# Patient Record
Sex: FEMALE | Race: WHITE | NOT HISPANIC OR LATINO | Employment: FULL TIME | URBAN - METROPOLITAN AREA
[De-identification: names, ages, dates, MRNs, and addresses within clinical notes are randomized per-mention and may not be internally consistent; named-entity substitution may affect disease eponyms.]

---

## 2019-02-15 ENCOUNTER — OFFICE VISIT (OUTPATIENT)
Dept: FAMILY MEDICINE CLINIC | Facility: CLINIC | Age: 49
End: 2019-02-15

## 2019-02-15 VITALS
OXYGEN SATURATION: 98 % | SYSTOLIC BLOOD PRESSURE: 142 MMHG | HEART RATE: 75 BPM | WEIGHT: 161 LBS | BODY MASS INDEX: 27.49 KG/M2 | RESPIRATION RATE: 18 BRPM | TEMPERATURE: 97.7 F | HEIGHT: 64 IN | DIASTOLIC BLOOD PRESSURE: 80 MMHG

## 2019-02-15 DIAGNOSIS — H65.02 ACUTE SEROUS OTITIS MEDIA OF LEFT EAR, RECURRENCE NOT SPECIFIED: ICD-10-CM

## 2019-02-15 DIAGNOSIS — J45.20 MILD INTERMITTENT ASTHMA WITHOUT COMPLICATION: Primary | ICD-10-CM

## 2019-02-15 PROCEDURE — 99213 OFFICE O/P EST LOW 20 MIN: CPT | Performed by: NURSE PRACTITIONER

## 2019-02-15 RX ORDER — ALBUTEROL SULFATE 90 UG/1
2 AEROSOL, METERED RESPIRATORY (INHALATION) EVERY 4 HOURS PRN
COMMUNITY
End: 2019-02-15 | Stop reason: SDUPTHER

## 2019-02-15 RX ORDER — AMOXICILLIN 875 MG/1
875 TABLET, COATED ORAL 2 TIMES DAILY
Qty: 20 TABLET | Refills: 0 | Status: SHIPPED | OUTPATIENT
Start: 2019-02-15 | End: 2021-04-26

## 2019-02-15 RX ORDER — ALBUTEROL SULFATE 90 UG/1
2 AEROSOL, METERED RESPIRATORY (INHALATION) EVERY 4 HOURS PRN
Qty: 1 INHALER | Refills: 11 | Status: SHIPPED | OUTPATIENT
Start: 2019-02-15 | End: 2023-02-16

## 2019-02-15 NOTE — PROGRESS NOTES
Subjective   Catarina Pack is a 48 y.o. female.   Chief Complaint   Patient presents with   • Earache     patient c/o left ear pain/fullness     Vitals:    02/15/19 1306   BP: 142/80   Pulse: 75   Resp: 18   Temp: 97.7 °F (36.5 °C)   SpO2: 98%     No LMP recorded.    Catarina is here for an acute visit.       Earache    There is pain in the left ear. This is a new problem. The current episode started in the past 7 days. The problem occurs constantly. The problem has been unchanged. There has been no fever. The pain is mild. Pertinent negatives include no coughing, ear discharge or rhinorrhea. She has tried nothing for the symptoms. There is no history of a chronic ear infection, hearing loss or a tympanostomy tube.    She has a history of asthma and would like a refill on albuterol     The following portions of the patient's history were reviewed and updated as appropriate: allergies, current medications, past family history, past medical history, past social history, past surgical history and problem list.    Review of Systems   Constitutional: Negative for chills and fatigue.   HENT: Positive for congestion and ear pain. Negative for ear discharge and rhinorrhea.    Respiratory: Negative for cough, shortness of breath and wheezing.    Cardiovascular: Negative.        Objective   Physical Exam   Constitutional: Vital signs are normal. She appears well-developed and well-nourished. She does not appear ill. No distress.   HENT:   Head: Normocephalic.   Left Ear: Tympanic membrane is erythematous. A middle ear effusion is present.   Nose: Nose normal.   Mouth/Throat: Uvula is midline, oropharynx is clear and moist and mucous membranes are normal.   Right TM obscured by cerumen    Cardiovascular: Normal rate, regular rhythm and normal heart sounds.   Pulmonary/Chest: Effort normal and breath sounds normal.   Neurological: She is alert.       Assessment/Plan   Catarina was seen today for  earache.    Diagnoses and all orders for this visit:    Mild intermittent asthma without complication    Acute serous otitis media of left ear, recurrence not specified    Other orders  -     albuterol sulfate  (90 Base) MCG/ACT inhaler; Inhale 2 puffs Every 4 (Four) Hours As Needed for Wheezing.  -     amoxicillin (AMOXIL) 875 MG tablet; Take 1 tablet by mouth 2 (Two) Times a Day.      Suggest Flonase or Nasacort over-the-counter  Follow-up in 2 weeks if your symptoms are persistent or sooner if your symptoms worsen or new symptoms develop

## 2021-04-22 ENCOUNTER — TELEPHONE (OUTPATIENT)
Dept: INTERNAL MEDICINE | Facility: CLINIC | Age: 51
End: 2021-04-22

## 2021-04-22 NOTE — TELEPHONE ENCOUNTER
Caller: Catarina Carlson    Relationship: Self    Best call back number:     What is the best time to reach you: ANYTIME    Who are you requesting to speak with (clinical staff, provider,  specific staff member):       Do you know the name of the person who called:     What was the call regarding: PATIENT IS CALLING IN STATING THAT SHE IS HAVING A REALLY HARD TIME WITH HEMORRHOIDS AND SHE WANTS TO KNOW IF THERE IS SOMETHING SO CAN DO TO HAVE THEM REMOVED.  SHE WANTS TO KNOW IF SHE NEEDS TO SEE DR KENY WISE OF IF SHE NEEDS TO BE REFERRED TO SOMEONE.     Do you require a callback: YES

## 2021-04-23 NOTE — TELEPHONE ENCOUNTER
I would like to see her since I haven't seen her since 2019. If she cannot come in. Please give her colorectal surgery # and she can call and schedule an appt . Also please make sure she knows that I changed locations.   Thank you!

## 2021-04-26 ENCOUNTER — OFFICE VISIT (OUTPATIENT)
Dept: INTERNAL MEDICINE | Facility: CLINIC | Age: 51
End: 2021-04-26

## 2021-04-26 VITALS
OXYGEN SATURATION: 98 % | HEART RATE: 70 BPM | TEMPERATURE: 97.7 F | HEIGHT: 64 IN | WEIGHT: 156 LBS | SYSTOLIC BLOOD PRESSURE: 122 MMHG | BODY MASS INDEX: 26.63 KG/M2 | RESPIRATION RATE: 16 BRPM | DIASTOLIC BLOOD PRESSURE: 60 MMHG

## 2021-04-26 DIAGNOSIS — K64.9 HEMORRHOIDS, UNSPECIFIED HEMORRHOID TYPE: Primary | ICD-10-CM

## 2021-04-26 DIAGNOSIS — Z12.11 ENCOUNTER FOR SCREENING COLONOSCOPY: ICD-10-CM

## 2021-04-26 DIAGNOSIS — D22.9 NEVUS: ICD-10-CM

## 2021-04-26 PROCEDURE — 99213 OFFICE O/P EST LOW 20 MIN: CPT | Performed by: NURSE PRACTITIONER

## 2021-04-26 RX ORDER — PRAMOXINE HYDROCHLORIDE HYDROCORTISONE ACETATE 100; 100 MG/10G; MG/10G
1 AEROSOL, FOAM TOPICAL 2 TIMES DAILY
Qty: 10 G | Refills: 3 | Status: SHIPPED | OUTPATIENT
Start: 2021-04-26 | End: 2021-09-22

## 2021-04-26 NOTE — PROGRESS NOTES
Subjective   Catarina Pack is a 50 y.o. female.   Chief Complaint   Patient presents with   • Hemorrhoids     flare up   • Suspicious Skin Lesion     Vitals:    04/26/21 1400   BP: 122/60   Pulse: 70   Resp: 16   Temp: 97.7 °F (36.5 °C)   SpO2: 98%     Patient's last menstrual period was 04/23/2021.    History of Present Illness  Catarina is a 50 year old female patient who is here for an acute visit. She c/o hemorrhoids for the last 3 weeks. She has done otc medication and sitz baths with some relief. She wants to discuss possible surgery or removal. She has been more active with exercise . She has a high fiber diet and takes metamucil daily  She has some moles on her arm and leg she would like me to look at .     The following portions of the patient's history were reviewed and updated as appropriate: allergies, current medications, past family history, past medical history, past social history, past surgical history and problem list.    Review of Systems   Constitutional: Negative.    Respiratory: Negative.    Cardiovascular: Negative.    Gastrointestinal: Negative for anal bleeding, blood in stool and constipation.        Hemorrhoids        Objective   Physical Exam  Vitals and nursing note reviewed.   Constitutional:       General: She is not in acute distress.  HENT:      Head: Normocephalic.   Cardiovascular:      Rate and Rhythm: Normal rate.   Pulmonary:      Effort: Pulmonary effort is normal.   Genitourinary:     Rectum: External hemorrhoid (2 small  noted, one is erythematous ) present.   Skin:     General: Skin is warm and dry.      Comments: Raised nevus on left upper arm  Raised nevus on left lower leg    Neurological:      Mental Status: She is alert and oriented to person, place, and time.         Assessment/Plan   Diagnoses and all orders for this visit:    1. Hemorrhoids, unspecified hemorrhoid type (Primary)  -     Ambulatory Referral to Colorectal Surgery    2. Encounter for  screening colonoscopy  -     Ambulatory Referral to Colorectal Surgery    3. Nevus    Other orders  -     Hydrocort-Pramoxine, Perianal, (Proctofoam HC) 1-1 % rectal foam; Insert 1 applicator into the rectum 2 (Two) Times a Day. As need for hemorrhoids  Dispense: 10 g; Refill: 3      Will refer to colorectal surgery for hemorrhoids, she is also due for screening colonoscopy  Continue sitz baths as needed  Avoid straining   Continue high fiber diet  Witch hazel pads as needed  Recommend scheduling an appt with dermatology for evaluation of nevi  Schedule CPE with fasting labs  Follow up sooner if symptoms persist, worsen or if new symptoms develop

## 2021-07-26 ENCOUNTER — OFFICE VISIT (OUTPATIENT)
Dept: SURGERY | Facility: CLINIC | Age: 51
End: 2021-07-26

## 2021-07-26 VITALS
HEIGHT: 64 IN | BODY MASS INDEX: 27.06 KG/M2 | HEART RATE: 61 BPM | DIASTOLIC BLOOD PRESSURE: 84 MMHG | OXYGEN SATURATION: 98 % | SYSTOLIC BLOOD PRESSURE: 140 MMHG | WEIGHT: 158.5 LBS | TEMPERATURE: 97.6 F

## 2021-07-26 DIAGNOSIS — Z12.11 SCREENING FOR MALIGNANT NEOPLASM OF COLON: Primary | ICD-10-CM

## 2021-07-26 DIAGNOSIS — K64.4 ANAL SKIN TAG: ICD-10-CM

## 2021-07-26 PROCEDURE — 99243 OFF/OP CNSLTJ NEW/EST LOW 30: CPT | Performed by: COLON & RECTAL SURGERY

## 2021-07-26 RX ORDER — SODIUM CHLORIDE, SODIUM LACTATE, POTASSIUM CHLORIDE, CALCIUM CHLORIDE 600; 310; 30; 20 MG/100ML; MG/100ML; MG/100ML; MG/100ML
30 INJECTION, SOLUTION INTRAVENOUS CONTINUOUS
Status: CANCELLED | OUTPATIENT
Start: 2021-09-23

## 2021-07-26 NOTE — PROGRESS NOTES
Catarina Pack is a 51 y.o. female who is seen as a consult at the request of SAJI Verdin for Consult.  Anal swelling    HPI:  Pt c/o hemorrhoids that become enlarged and bothersome approximately every 4 months and lasts for 4-6 weeks.   Area can become painful when they are enlarged.   Concerned that some of her symptoms may be secondary to strength training   Uses OTC creams and sitz baths with some improvement in symptoms.   No RB  Also c/o bloating    Denies any previous colonoscopy     Unknown FHx of colon polyps or colon cancer    Past Medical History:   Diagnosis Date   • Allergic rhinitis    • Asthma     MILD,INTERMITTENT   • Breast asymmetry    • Fibrocystic mastopathy, left 2015   • H/O threatened  2016   • Hemorrhoids    • Influenza A 2018   • SAB (spontaneous )      A1       Past Surgical History:   Procedure Laterality Date   • COLONOSCOPY N/A 2012   • VAGINAL DELIVERY N/A 10/04/2013    DR. CHERRY PEREZ AT [facility]       Social History:   reports that she has never smoked. She has never used smokeless tobacco. She reports current alcohol use. She reports that she does not use drugs.      Marriage status:     Family History   Adopted: Yes   Problem Relation Age of Onset   • Diabetes Son          Current Outpatient Medications:   •  albuterol sulfate  (90 Base) MCG/ACT inhaler, Inhale 2 puffs Every 4 (Four) Hours As Needed for Wheezing., Disp: 1 inhaler, Rfl: 11  •  Hydrocort-Pramoxine, Perianal, (Proctofoam HC) 1-1 % rectal foam, Insert 1 applicator into the rectum 2 (Two) Times a Day. As need for hemorrhoids, Disp: 10 g, Rfl: 3  •  hydrocortisone 2.5 % cream, Insert  into the rectum., Disp: , Rfl:     Allergy  Patient has no known allergies.    Review of Systems   Constitutional: Negative for decreased appetite and weight gain.   HENT: Negative for congestion, hearing loss and hoarse voice.    Eyes: Negative for blurred vision,  discharge and visual disturbance.   Cardiovascular: Negative for chest pain, cyanosis and leg swelling.   Respiratory: Negative for cough, shortness of breath, sleep disturbances due to breathing and snoring.    Endocrine: Negative for cold intolerance and heat intolerance.   Hematologic/Lymphatic: Does not bruise/bleed easily.   Skin: Negative for itching, poor wound healing and skin cancer.   Musculoskeletal: Negative for arthritis, back pain, joint pain and joint swelling.   Gastrointestinal: Negative for abdominal pain, change in bowel habit, bowel incontinence and constipation.   Genitourinary: Negative for bladder incontinence, dysuria and hematuria.   Neurological: Negative for brief paralysis, excessive daytime sleepiness, dizziness, focal weakness, headaches, light-headedness and weakness.   Psychiatric/Behavioral: Negative for altered mental status and hallucinations. The patient does not have insomnia.    Allergic/Immunologic: Negative for HIV exposure and persistent infections.   All other systems reviewed and are negative.      Vitals:    07/26/21 1317   BP: 140/84   Pulse: 61   Temp: 97.6 °F (36.4 °C)   SpO2: 98%     Body mass index is 27.21 kg/m².    Physical Exam  Exam conducted with a chaperone present.   Constitutional:       General: She is not in acute distress.     Appearance: She is well-developed.   HENT:      Head: Normocephalic and atraumatic.      Nose: Nose normal.   Eyes:      Conjunctiva/sclera: Conjunctivae normal.      Pupils: Pupils are equal, round, and reactive to light.   Neck:      Trachea: No tracheal deviation.   Pulmonary:      Effort: Pulmonary effort is normal. No respiratory distress.      Breath sounds: Normal breath sounds.   Abdominal:      General: Bowel sounds are normal. There is no distension.      Palpations: Abdomen is soft.   Genitourinary:     Comments: Perianal exam: external anal tag posteriorly   JORGE- good tone, no masses      Musculoskeletal:         General:  No deformity. Normal range of motion.      Cervical back: Normal range of motion.   Skin:     General: Skin is warm and dry.   Neurological:      Mental Status: She is alert and oriented to person, place, and time.      Cranial Nerves: No cranial nerve deficit.      Coordination: Coordination normal.      Gait: Gait normal.   Psychiatric:         Behavior: Behavior normal.         Judgment: Judgment normal.         Assessment:  1. Screening for malignant neoplasm of colon    2. Anal skin tag      Plan:  - Will schedule colonoscopy with an anal skin tag removal  - Recommended probiotics and Fiber      Scribed for Jose Ortiz MD by Radha Armstrong PA-C 7/27/2021  20:39 EDT   This patient was evaluated by me, recommendations made, documentation reviewed, edited, and revised by me, Jose Ortiz MD

## 2021-09-23 ENCOUNTER — ANESTHESIA (OUTPATIENT)
Dept: GASTROENTEROLOGY | Facility: HOSPITAL | Age: 51
End: 2021-09-23

## 2021-09-23 ENCOUNTER — HOSPITAL ENCOUNTER (OUTPATIENT)
Facility: HOSPITAL | Age: 51
Setting detail: HOSPITAL OUTPATIENT SURGERY
Discharge: HOME | End: 2021-09-23
Attending: COLON & RECTAL SURGERY | Admitting: COLON & RECTAL SURGERY

## 2021-09-23 ENCOUNTER — ANESTHESIA EVENT (OUTPATIENT)
Dept: GASTROENTEROLOGY | Facility: HOSPITAL | Age: 51
End: 2021-09-23

## 2021-09-23 VITALS
HEART RATE: 63 BPM | DIASTOLIC BLOOD PRESSURE: 74 MMHG | WEIGHT: 152.8 LBS | OXYGEN SATURATION: 99 % | BODY MASS INDEX: 26.09 KG/M2 | HEIGHT: 64 IN | SYSTOLIC BLOOD PRESSURE: 117 MMHG | RESPIRATION RATE: 14 BRPM

## 2021-09-23 DIAGNOSIS — Z12.11 SCREENING FOR MALIGNANT NEOPLASM OF COLON: ICD-10-CM

## 2021-09-23 LAB
B-HCG UR QL: NEGATIVE
INTERNAL NEGATIVE CONTROL: NEGATIVE
INTERNAL POSITIVE CONTROL: POSITIVE
Lab: NORMAL

## 2021-09-23 PROCEDURE — 88304 TISSUE EXAM BY PATHOLOGIST: CPT | Performed by: COLON & RECTAL SURGERY

## 2021-09-23 PROCEDURE — 25010000002 PROPOFOL 10 MG/ML EMULSION: Performed by: NURSE ANESTHETIST, CERTIFIED REGISTERED

## 2021-09-23 PROCEDURE — 46220 EXCISE ANAL EXT TAG/PAPILLA: CPT | Performed by: COLON & RECTAL SURGERY

## 2021-09-23 PROCEDURE — 25010000002 KETOROLAC TROMETHAMINE PER 15 MG: Performed by: NURSE ANESTHETIST, CERTIFIED REGISTERED

## 2021-09-23 PROCEDURE — 81025 URINE PREGNANCY TEST: CPT | Performed by: COLON & RECTAL SURGERY

## 2021-09-23 PROCEDURE — 88305 TISSUE EXAM BY PATHOLOGIST: CPT | Performed by: COLON & RECTAL SURGERY

## 2021-09-23 PROCEDURE — 45380 COLONOSCOPY AND BIOPSY: CPT | Performed by: COLON & RECTAL SURGERY

## 2021-09-23 RX ORDER — PROPOFOL 10 MG/ML
VIAL (ML) INTRAVENOUS CONTINUOUS PRN
Status: DISCONTINUED | OUTPATIENT
Start: 2021-09-23 | End: 2021-09-23 | Stop reason: SURG

## 2021-09-23 RX ORDER — SODIUM CHLORIDE, SODIUM LACTATE, POTASSIUM CHLORIDE, CALCIUM CHLORIDE 600; 310; 30; 20 MG/100ML; MG/100ML; MG/100ML; MG/100ML
INJECTION, SOLUTION INTRAVENOUS CONTINUOUS PRN
Status: DISCONTINUED | OUTPATIENT
Start: 2021-09-23 | End: 2021-09-23 | Stop reason: SURG

## 2021-09-23 RX ORDER — LIDOCAINE HYDROCHLORIDE 20 MG/ML
INJECTION, SOLUTION INFILTRATION; PERINEURAL AS NEEDED
Status: DISCONTINUED | OUTPATIENT
Start: 2021-09-23 | End: 2021-09-23 | Stop reason: SURG

## 2021-09-23 RX ORDER — SODIUM CHLORIDE, SODIUM LACTATE, POTASSIUM CHLORIDE, CALCIUM CHLORIDE 600; 310; 30; 20 MG/100ML; MG/100ML; MG/100ML; MG/100ML
30 INJECTION, SOLUTION INTRAVENOUS CONTINUOUS
Status: DISCONTINUED | OUTPATIENT
Start: 2021-09-23 | End: 2021-09-23 | Stop reason: HOSPADM

## 2021-09-23 RX ORDER — SODIUM CHLORIDE, SODIUM LACTATE, POTASSIUM CHLORIDE, CALCIUM CHLORIDE 600; 310; 30; 20 MG/100ML; MG/100ML; MG/100ML; MG/100ML
30 INJECTION, SOLUTION INTRAVENOUS CONTINUOUS PRN
Status: DISCONTINUED | OUTPATIENT
Start: 2021-09-23 | End: 2021-09-23 | Stop reason: HOSPADM

## 2021-09-23 RX ORDER — KETOROLAC TROMETHAMINE 30 MG/ML
INJECTION, SOLUTION INTRAMUSCULAR; INTRAVENOUS AS NEEDED
Status: DISCONTINUED | OUTPATIENT
Start: 2021-09-23 | End: 2021-09-23 | Stop reason: SURG

## 2021-09-23 RX ORDER — BUPIVACAINE HYDROCHLORIDE AND EPINEPHRINE 5; 5 MG/ML; UG/ML
INJECTION, SOLUTION EPIDURAL; INTRACAUDAL; PERINEURAL AS NEEDED
Status: DISCONTINUED | OUTPATIENT
Start: 2021-09-23 | End: 2021-09-23 | Stop reason: HOSPADM

## 2021-09-23 RX ADMIN — KETOROLAC TROMETHAMINE 30 MG: 30 INJECTION, SOLUTION INTRAMUSCULAR; INTRAVENOUS at 12:19

## 2021-09-23 RX ADMIN — SODIUM CHLORIDE, POTASSIUM CHLORIDE, SODIUM LACTATE AND CALCIUM CHLORIDE 30 ML/HR: 600; 310; 30; 20 INJECTION, SOLUTION INTRAVENOUS at 11:45

## 2021-09-23 RX ADMIN — SODIUM CHLORIDE, POTASSIUM CHLORIDE, SODIUM LACTATE AND CALCIUM CHLORIDE: 600; 310; 30; 20 INJECTION, SOLUTION INTRAVENOUS at 11:43

## 2021-09-23 RX ADMIN — LIDOCAINE HYDROCHLORIDE 60 MG: 20 INJECTION, SOLUTION INFILTRATION; PERINEURAL at 12:03

## 2021-09-23 RX ADMIN — PROPOFOL 300 MCG/KG/MIN: 10 INJECTION, EMULSION INTRAVENOUS at 12:03

## 2021-09-23 NOTE — ANESTHESIA POSTPROCEDURE EVALUATION
"Patient: Catarina Pack    Procedure Summary     Date: 09/23/21 Room / Location:  [facility]    Anesthesia Start: 1156 Anesthesia Stop: 1223    Procedure: COLONOSCOPY to cecum with cold bx polypectomy anal tag removal (N/A ) Diagnosis:       Screening for malignant neoplasm of colon      (Screening for malignant neoplasm of colon [Z12.11])    Surgeons: Jose Ortiz MD Provider: Mason Lees MD    Anesthesia Type: MAC ASA Status: 2          Anesthesia Type: MAC    Vitals  Vitals Value Taken Time   /74 09/23/21 1249   Temp     Pulse 63 09/23/21 1249   Resp 14 09/23/21 1249   SpO2 99 % 09/23/21 1249           Post Anesthesia Care and Evaluation    Patient location during evaluation: bedside  Patient participation: complete - patient participated  Level of consciousness: awake and alert  Pain management: adequate  Airway patency: patent  Anesthetic complications: No anesthetic complications    Cardiovascular status: acceptable  Respiratory status: acceptable  Hydration status: acceptable    Comments: /74   Pulse 63   Resp 14   Ht 162.6 cm (64\")   Wt 69.3 kg (152 lb 12.8 oz)   LMP 09/16/2021   SpO2 99%   BMI 26.23 kg/m²       "

## 2021-09-23 NOTE — OP NOTE
09/23/21    Surgeon: Jose Ortiz MD      Preoperative and post op diagnosis: anal tag    Procedure:  anal tag removal, * Panel 2 does not exist *    Estimated Blood Loss: minimal    Specimens:   Specimens                         Description: transverse colon polyp    B Anus Tissue · TISSUE PATHOLOGY EXAM   Jose Oritz MD 9/23/21 1221     Description: anal skin tag         Order Name Source Comment Collection Info Order Time   TISSUE PATHOLOGY EXAM Large Intestine, Transverse Colon  Collected By: Jose Ortiz MD 9/23/2021 12:13 PM     Release to patient   Immediate            Indication:  Catarina Pack is a 51 y.o. female who comes in with enlarged anal skin tag and wishes to have it removed patient understands risks, benefits,and alternatives wishes to proceed.      Procedure Details:    After colonoscopy was done , patient was still in the left lateral decubitus position.  Patient was numbed with 1% lidocaine.  An elliptical incision was made around the anal tag using a 11 blade scalpel.  I removed the anal tag at its base.  Sphincter muscle was swept out of the way.  I closed the incision with a running 2-0 Vicryl.  Patient tolerated it well.

## 2021-09-23 NOTE — DISCHARGE INSTRUCTIONS
For the next 24 hours patient needs to be with a responsible adult.    For 24 hours DO NOT drive, operate machinery, appliances, drink alcohol, make important decisions or sign legal documents.    Start with a light or bland diet if you are feeling sick to your stomach otherwise advance to regular diet as tolerated.    Follow recommendations on procedure report if provided by your doctor.    Call Dr. Ortiz for problems [phone]    Problems may include but not limited to: large amounts of bleeding, trouble breathing, repeated vomiting, severe unrelieved pain, fever or chills.

## 2021-09-23 NOTE — ANESTHESIA PREPROCEDURE EVALUATION
Anesthesia Evaluation     Patient summary reviewed and Nursing notes reviewed   no history of anesthetic complications:  NPO Solid Status: > 8 hours  NPO Liquid Status: > 2 hours           Airway   Mallampati: II  TM distance: >3 FB  Neck ROM: full  No difficulty expected  Dental      Pulmonary    (+) asthma,  Cardiovascular   Exercise tolerance: good (4-7 METS)        Neuro/Psych  GI/Hepatic/Renal/Endo      Musculoskeletal     Abdominal    Substance History      OB/GYN          Other                        Anesthesia Plan    ASA 2     MAC     intravenous induction     Anesthetic plan, all risks, benefits, and alternatives have been provided, discussed and informed consent has been obtained with: patient.

## 2021-09-23 NOTE — H&P
Pt c/o hemorrhoids that become enlarged and bothersome approximately every 4 months and lasts for 4-6 weeks.   Area can become painful when they are enlarged.   Concerned that some of her symptoms may be secondary to strength training   Uses OTC creams and sitz baths with some improvement in symptoms.   No RB  Also c/o bloating     Denies any previous colonoscopy      Unknown FHx of colon polyps or colon cancer     Medical History        Past Medical History:   Diagnosis Date   • Allergic rhinitis     • Asthma       MILD,INTERMITTENT   • Breast asymmetry     • Fibrocystic mastopathy, left 2015   • H/O threatened  2016   • Hemorrhoids     • Influenza A 2018   • SAB (spontaneous )        A1            Surgical History         Past Surgical History:   Procedure Laterality Date   • COLONOSCOPY N/A 2012   • VAGINAL DELIVERY N/A 10/04/2013     DR. CHERRY PEREZ AT [facility]            Social History:   reports that she has never smoked. She has never used smokeless tobacco. She reports current alcohol use. She reports that she does not use drugs.        Marriage status:            Family History   Adopted: Yes   Problem Relation Age of Onset   • Diabetes Son              Current Outpatient Medications:   •  albuterol sulfate  (90 Base) MCG/ACT inhaler, Inhale 2 puffs Every 4 (Four) Hours As Needed for Wheezing., Disp: 1 inhaler, Rfl: 11  •  Hydrocort-Pramoxine, Perianal, (Proctofoam HC) 1-1 % rectal foam, Insert 1 applicator into the rectum 2 (Two) Times a Day. As need for hemorrhoids, Disp: 10 g, Rfl: 3  •  hydrocortisone 2.5 % cream, Insert  into the rectum., Disp: , Rfl:      Allergy  Patient has no known allergies.     Review of Systems   Constitutional: Negative for decreased appetite and weight gain.   HENT: Negative for congestion, hearing loss and hoarse voice.    Eyes: Negative for blurred vision, discharge and visual disturbance.   Cardiovascular: Negative for chest  "pain, cyanosis and leg swelling.   Respiratory: Negative for cough, shortness of breath, sleep disturbances due to breathing and snoring.    Endocrine: Negative for cold intolerance and heat intolerance.   Hematologic/Lymphatic: Does not bruise/bleed easily.   Skin: Negative for itching, poor wound healing and skin cancer.   Musculoskeletal: Negative for arthritis, back pain, joint pain and joint swelling.   Gastrointestinal: Negative for abdominal pain, change in bowel habit, bowel incontinence and constipation.   Genitourinary: Negative for bladder incontinence, dysuria and hematuria.   Neurological: Negative for brief paralysis, excessive daytime sleepiness, dizziness, focal weakness, headaches, light-headedness and weakness.   Psychiatric/Behavioral: Negative for altered mental status and hallucinations. The patient does not have insomnia.    Allergic/Immunologic: Negative for HIV exposure and persistent infections.   All other systems reviewed and are negative.            /67 (BP Location: Left arm, Patient Position: Lying)   Pulse 62   Resp 13   Ht 162.6 cm (64\")   Wt 69.3 kg (152 lb 12.8 oz)   LMP 09/16/2021   SpO2 98%   BMI 26.23 kg/m²   .     Physical Exam  Exam conducted with a chaperone present.   Constitutional:       General: She is not in acute distress.     Appearance: She is well-developed.   HENT:      Head: Normocephalic and atraumatic.      Nose: Nose normal.   Eyes:      Conjunctiva/sclera: Conjunctivae normal.      Pupils: Pupils are equal, round, and reactive to light.   Neck:      Trachea: No tracheal deviation.   Pulmonary:      Effort: Pulmonary effort is normal. No respiratory distress.      Breath sounds: Normal breath sounds.   Abdominal:      General: Bowel sounds are normal. There is no distension.      Palpations: Abdomen is soft.   Genitourinary:     Comments: Perianal exam: external anal tag posteriorly   JORGE- good tone, no masses      Musculoskeletal:         General: " No deformity. Normal range of motion.      Cervical back: Normal range of motion.   Skin:     General: Skin is warm and dry.   Neurological:      Mental Status: She is alert and oriented to person, place, and time.      Cranial Nerves: No cranial nerve deficit.      Coordination: Coordination normal.      Gait: Gait normal.   Psychiatric:         Behavior: Behavior normal.         Judgment: Judgment normal.            Assessment:  1. Screening for malignant neoplasm of colon    2. Anal skin tag       Plan:  - Will schedule colonoscopy with an anal skin tag removal  - Recommended probiotics and Fiber

## 2021-09-24 LAB
LAB AP CASE REPORT: NORMAL
PATH REPORT.FINAL DX SPEC: NORMAL
PATH REPORT.GROSS SPEC: NORMAL

## 2023-02-16 ENCOUNTER — OFFICE VISIT (OUTPATIENT)
Dept: INTERNAL MEDICINE | Facility: CLINIC | Age: 53
End: 2023-02-16
Payer: COMMERCIAL

## 2023-02-16 VITALS
WEIGHT: 157.6 LBS | SYSTOLIC BLOOD PRESSURE: 126 MMHG | BODY MASS INDEX: 26.91 KG/M2 | HEIGHT: 64 IN | OXYGEN SATURATION: 100 % | DIASTOLIC BLOOD PRESSURE: 64 MMHG | TEMPERATURE: 98.3 F | HEART RATE: 67 BPM

## 2023-02-16 DIAGNOSIS — G89.29 CHRONIC PAIN OF BOTH KNEES: ICD-10-CM

## 2023-02-16 DIAGNOSIS — Z00.00 ANNUAL PHYSICAL EXAM: Primary | ICD-10-CM

## 2023-02-16 DIAGNOSIS — Z11.59 NEED FOR HEPATITIS C SCREENING TEST: ICD-10-CM

## 2023-02-16 DIAGNOSIS — M25.561 CHRONIC PAIN OF BOTH KNEES: ICD-10-CM

## 2023-02-16 DIAGNOSIS — M25.562 CHRONIC PAIN OF BOTH KNEES: ICD-10-CM

## 2023-02-16 PROCEDURE — 99396 PREV VISIT EST AGE 40-64: CPT | Performed by: NURSE PRACTITIONER

## 2023-02-16 NOTE — PROGRESS NOTES
Subjective   Catarina Pack is a 52 y.o. female. Patient is here today for   Chief Complaint   Patient presents with   • Annual Exam     CPE. Review of Medical Issues          Vitals:    23 1525   BP: 126/64   Pulse: 67   Temp: 98.3 °F (36.8 °C)   SpO2: 100%     Body mass index is 27.05 kg/m².    The following portions of the patient's history were reviewed and updated as appropriate: allergies, current medications, past family history, past medical history, past social history, past surgical history and problem list.    Past Medical History:   Diagnosis Date   • Allergic rhinitis    • Anal skin tag 2021    BENIGN FIBROEPITHELIAL POLYP   • Asthma     MILD,INTERMITTENT   • Breast asymmetry    • Colon polyps     FOLLOWED BY DR. NICOL PERRY    • Fibrocystic mastopathy, left 2015   • H/O threatened  2016   • Hemorrhoids    • Influenza A 2018   • SAB (spontaneous )      A1      No Known Allergies   Social History     Socioeconomic History   • Marital status:    Tobacco Use   • Smoking status: Never   • Smokeless tobacco: Never   Vaping Use   • Vaping Use: Never used   Substance and Sexual Activity   • Alcohol use: Yes     Alcohol/week: 6.0 standard drinks     Types: 6 Cans of beer per week   • Drug use: Never   • Sexual activity: Yes     Partners: Male     Comment: .      No current outpatient medications on file.       Objective   History of Present Illness   Catarina Pack 52 y.o. female who presents for an Annual Wellness Visit.  she has a history of   Patient Active Problem List   Diagnosis   • Screening for malignant neoplasm of colon   .  She has had arthralgias in both knees for the last 2 years. She runs with her son and has had to limit her activity due to pain.     Plan to update vaccines if needed today.  She has a gyn for pap and mammogram   Health Habits:  Dental Exam. up to date  Eye Exam. not up to date - does need , had lasik    Exercise: occasional times/week.  Current exercise activities include: running/ jogging    Preventative counseling  Discussed face to face the importance of healthy diet and exercise.     Lab Results (most recent)     .            Review of Systems   Constitutional: Negative.    HENT: Negative.    Eyes: Negative.    Respiratory: Negative.    Cardiovascular: Negative.    Gastrointestinal: Negative.    Endocrine: Negative.    Genitourinary: Negative.    Musculoskeletal: Positive for arthralgias.   Allergic/Immunologic: Negative.    Neurological: Negative.    Hematological: Negative.    Psychiatric/Behavioral: Negative.        Physical Exam  Vitals and nursing note reviewed.   Constitutional:       General: She is not in acute distress.     Appearance: Normal appearance. She is well-developed and well-groomed.   HENT:      Head: Normocephalic.      Right Ear: Tympanic membrane and ear canal normal.      Left Ear: Tympanic membrane and ear canal normal.      Nose: Nose normal.      Mouth/Throat:      Pharynx: Oropharynx is clear.   Eyes:      General: Lids are normal.      Conjunctiva/sclera: Conjunctivae normal.   Neck:      Thyroid: No thyromegaly.   Cardiovascular:      Rate and Rhythm: Normal rate and regular rhythm.      Heart sounds: Normal heart sounds.   Pulmonary:      Effort: Pulmonary effort is normal.      Breath sounds: Normal breath sounds.   Abdominal:      General: Bowel sounds are normal.      Tenderness: There is no abdominal tenderness.   Musculoskeletal:      Cervical back: Neck supple.      Right knee: Crepitus present. No swelling. Normal range of motion. No tenderness.      Left knee: Crepitus present. No swelling. Normal range of motion. No tenderness.   Skin:     General: Skin is warm and dry.   Neurological:      Mental Status: She is alert and oriented to person, place, and time.   Psychiatric:         Mood and Affect: Mood normal.         ASSESSMENT       Problems Addressed this Visit     None  Visit Diagnoses     Annual physical exam    -  Primary    Relevant Orders    Lipid Panel With LDL / HDL Ratio    CBC & Differential    Comprehensive Metabolic Panel    TSH Rfx On Abnormal To Free T4    Need for hepatitis C screening test        Relevant Orders    Hepatitis C antibody    Chronic pain of both knees        Relevant Orders    Ambulatory Referral to Orthopedic Surgery      Diagnoses       Codes Comments    Annual physical exam    -  Primary ICD-10-CM: Z00.00  ICD-9-CM: V70.0     Need for hepatitis C screening test     ICD-10-CM: Z11.59  ICD-9-CM: V73.89     Chronic pain of both knees     ICD-10-CM: M25.561, M25.562, G89.29  ICD-9-CM: 719.46, 338.29           PLAN    Will check labs today and call with results  Pt has biometric form to complete  Will refer to ortho for chronic knee pain   Age and sex appropriate physical exam performed and documented. Updated past medical, family, social and surgical histories as well as allergies and care team list. Addressed care gaps listed in the medical record.   --Encouraged annual dental and vision exams as part of their overall health.   --Immunizations reviewed and updated in EMR. Discussed TDAP, declined today     Return in about 1 year (around 2/16/2024) for Annual physical.

## 2023-02-17 LAB
ALBUMIN SERPL-MCNC: 5 G/DL (ref 3.8–4.8)
ALBUMIN/GLOB SERPL: 1.7 {RATIO} (ref 1.2–2.2)
ALP SERPL-CCNC: 79 IU/L (ref 44–121)
ALT SERPL-CCNC: 14 IU/L (ref 0–32)
AST SERPL-CCNC: 18 IU/L (ref 0–40)
BASOPHILS # BLD AUTO: 0 X10E3/UL (ref 0–0.2)
BASOPHILS NFR BLD AUTO: 1 %
BILIRUB SERPL-MCNC: 0.4 MG/DL (ref 0–1.2)
BUN SERPL-MCNC: 10 MG/DL (ref 6–24)
BUN/CREAT SERPL: 13 (ref 9–23)
CALCIUM SERPL-MCNC: 9.9 MG/DL (ref 8.7–10.2)
CHLORIDE SERPL-SCNC: 101 MMOL/L (ref 96–106)
CHOLEST SERPL-MCNC: 246 MG/DL (ref 100–199)
CO2 SERPL-SCNC: 24 MMOL/L (ref 20–29)
CREAT SERPL-MCNC: 0.76 MG/DL (ref 0.57–1)
EGFRCR SERPLBLD CKD-EPI 2021: 95 ML/MIN/1.73
EOSINOPHIL # BLD AUTO: 0.3 X10E3/UL (ref 0–0.4)
EOSINOPHIL NFR BLD AUTO: 3 %
ERYTHROCYTE [DISTWIDTH] IN BLOOD BY AUTOMATED COUNT: 14.9 % (ref 11.7–15.4)
GLOBULIN SER CALC-MCNC: 3 G/DL (ref 1.5–4.5)
GLUCOSE SERPL-MCNC: 97 MG/DL (ref 70–99)
HCT VFR BLD AUTO: 36.4 % (ref 34–46.6)
HCV IGG SERPL QL IA: NON REACTIVE
HDLC SERPL-MCNC: 82 MG/DL
HGB BLD-MCNC: 11.6 G/DL (ref 11.1–15.9)
IMM GRANULOCYTES # BLD AUTO: 0 X10E3/UL (ref 0–0.1)
IMM GRANULOCYTES NFR BLD AUTO: 0 %
LDLC SERPL CALC-MCNC: 153 MG/DL (ref 0–99)
LDLC/HDLC SERPL: 1.9 RATIO (ref 0–3.2)
LYMPHOCYTES # BLD AUTO: 2.2 X10E3/UL (ref 0.7–3.1)
LYMPHOCYTES NFR BLD AUTO: 25 %
Lab: NORMAL
MCH RBC QN AUTO: 25.8 PG (ref 26.6–33)
MCHC RBC AUTO-ENTMCNC: 31.9 G/DL (ref 31.5–35.7)
MCV RBC AUTO: 81 FL (ref 79–97)
MONOCYTES # BLD AUTO: 0.6 X10E3/UL (ref 0.1–0.9)
MONOCYTES NFR BLD AUTO: 7 %
NEUTROPHILS # BLD AUTO: 5.7 X10E3/UL (ref 1.4–7)
NEUTROPHILS NFR BLD AUTO: 64 %
ONE SPECIMEN IDENTIFIER: NORMAL
PLATELET # BLD AUTO: 330 X10E3/UL (ref 150–450)
POTASSIUM SERPL-SCNC: 4.2 MMOL/L (ref 3.5–5.2)
PROT SERPL-MCNC: 8 G/DL (ref 6–8.5)
RBC # BLD AUTO: 4.5 X10E6/UL (ref 3.77–5.28)
SODIUM SERPL-SCNC: 140 MMOL/L (ref 134–144)
TRIGL SERPL-MCNC: 69 MG/DL (ref 0–149)
TSH SERPL DL<=0.005 MIU/L-ACNC: 0.55 UIU/ML (ref 0.45–4.5)
VLDLC SERPL CALC-MCNC: 11 MG/DL (ref 5–40)
WBC # BLD AUTO: 8.9 X10E3/UL (ref 3.4–10.8)

## 2023-02-28 ENCOUNTER — OFFICE VISIT (OUTPATIENT)
Dept: ORTHOPEDIC SURGERY | Facility: CLINIC | Age: 53
End: 2023-02-28
Payer: COMMERCIAL

## 2023-02-28 ENCOUNTER — PATIENT ROUNDING (BHMG ONLY) (OUTPATIENT)
Dept: ORTHOPEDIC SURGERY | Facility: CLINIC | Age: 53
End: 2023-02-28
Payer: COMMERCIAL

## 2023-02-28 VITALS — TEMPERATURE: 96.5 F | WEIGHT: 159 LBS | BODY MASS INDEX: 27.14 KG/M2 | HEIGHT: 64 IN

## 2023-02-28 DIAGNOSIS — M22.2X1 PATELLOFEMORAL ARTHRALGIA OF BOTH KNEES: Primary | ICD-10-CM

## 2023-02-28 DIAGNOSIS — M25.561 CHRONIC PAIN OF BOTH KNEES: ICD-10-CM

## 2023-02-28 DIAGNOSIS — M22.2X2 PATELLOFEMORAL ARTHRALGIA OF BOTH KNEES: Primary | ICD-10-CM

## 2023-02-28 DIAGNOSIS — G89.29 CHRONIC PAIN OF BOTH KNEES: ICD-10-CM

## 2023-02-28 DIAGNOSIS — M25.562 CHRONIC PAIN OF BOTH KNEES: ICD-10-CM

## 2023-02-28 PROCEDURE — 99213 OFFICE O/P EST LOW 20 MIN: CPT | Performed by: NURSE PRACTITIONER

## 2023-02-28 PROCEDURE — 73562 X-RAY EXAM OF KNEE 3: CPT | Performed by: NURSE PRACTITIONER

## 2023-02-28 NOTE — PROGRESS NOTES
A [employer] Message has been sent to the patient for PATIENT ROUNDING with [facility]

## 2023-03-15 ENCOUNTER — TELEPHONE (OUTPATIENT)
Dept: ORTHOPEDIC SURGERY | Facility: CLINIC | Age: 53
End: 2023-03-15

## 2023-03-15 NOTE — TELEPHONE ENCOUNTER
Caller: Catarina Carlson    Relationship to patient: Self    Best call back number: [phone]    SHRAVAN FROM [facility] RECENTLY RECEIVED REFERRAL FROM BECKY  BUT HE IS NEEDING PT DEMOGRAPHICS IN ORDER FOR HIM TO CONTACT PT. PLEASE FAX DEMOGRAPHICS -[phone]

## 2025-07-07 ENCOUNTER — OFFICE VISIT (OUTPATIENT)
Dept: INTERNAL MEDICINE | Facility: CLINIC | Age: 55
End: 2025-07-07
Payer: COMMERCIAL

## 2025-07-07 VITALS
OXYGEN SATURATION: 97 % | RESPIRATION RATE: 16 BRPM | WEIGHT: 146 LBS | SYSTOLIC BLOOD PRESSURE: 132 MMHG | BODY MASS INDEX: 24.92 KG/M2 | HEIGHT: 64 IN | HEART RATE: 79 BPM | DIASTOLIC BLOOD PRESSURE: 80 MMHG

## 2025-07-07 DIAGNOSIS — R19.7 DIARRHEA, UNSPECIFIED TYPE: Primary | ICD-10-CM

## 2025-07-07 PROCEDURE — 99213 OFFICE O/P EST LOW 20 MIN: CPT | Performed by: NURSE PRACTITIONER

## 2025-07-07 NOTE — PROGRESS NOTES
Subjective   Catarina Pack is a 55 y.o. female. Patient is here today for   Chief Complaint   Patient presents with    Diarrhea     X 7 days    Answers submitted by the patient for this visit:  Problem not listed (Submitted on 7/7/2025)  Chief Complaint: Other medical problem  Reason for appointment: Diarrhea for a week  anorexia: No  joint pain: No  change in stool: Yes  joint swelling: No  swollen glands: No  vertigo: No  visual change: No  Onset: in the past 7 days  Chronicity: new  Frequency: daily  Medications tried: Pepto  Additional information: Tums           Vitals:    07/07/25 1418   BP: 132/80   Pulse: 79   Resp: 16   SpO2: 97%     Body mass index is 25.06 kg/m².  The following portions of the patient's history were reviewed and updated as appropriate: allergies, current medications, past family history, past medical history, past social history, past surgical history and problem list.    History of Present Illness  The patient presents for evaluation of diarrhea. She was last seen in 2023     She has been experiencing diarrhea for the past 7 days, with bowel movements ranging from 3 to 7 times daily. Three days ago, she had a near-normal stool, but since then, her stools have been watery. She reports no abdominal pain or signs of dehydration. She recalls consuming ice water at a pizza joint during a camping trip at [facility], which she found unusual due to its pizza-like taste. She also mentions using a bladder from a backpack for drinking water, which had a plastic taste even after washing. Her family members consumed the same food as her, and her son experienced diarrhea once, but her  did not. She reports no presence of blood or black color in her stools, and no symptoms of fever, chills, nausea, or vomiting. She has not taken any antibiotics recently. She has pets at home, but they have not shown any signs of illness. Her  is on Zepbound, and when it was  unavailable, they used compound vials, which she administered monthly for the past 6 months. She last took it about 10 days ago. She still has her gallbladder and has been able to eat without any issues.    Past Medical History:   Diagnosis Date    Allergic rhinitis     Anal skin tag 2021    BENIGN FIBROEPITHELIAL POLYP    Asthma     MILD,INTERMITTENT    Breast asymmetry     Colon polyps     FOLLOWED BY DR. NICOL PERRY     Fibrocystic mastopathy, left 2015    H/O threatened  2016    Hemorrhoids     Influenza A 2018    SAB (spontaneous )      A1      No Known Allergies   Social History     Socioeconomic History    Marital status:    Tobacco Use    Smoking status: Never     Passive exposure: Never    Smokeless tobacco: Never   Vaping Use    Vaping status: Never Used   Substance and Sexual Activity    Alcohol use: Yes     Alcohol/week: 6.0 standard drinks of alcohol     Types: 6 Cans of beer per week    Drug use: Never    Sexual activity: Yes     Partners: Male     Birth control/protection: None     Comment: .      No current outpatient medications on file.   Review of Systems   Constitutional:  Negative for chills, diaphoresis, fatigue and fever.   HENT:  Negative for congestion and sore throat.    Respiratory:  Negative for cough.    Cardiovascular:  Negative for chest pain.   Gastrointestinal:  Positive for diarrhea. Negative for abdominal pain, blood in stool, nausea and vomiting.   Musculoskeletal:  Negative for myalgias and neck pain.   Skin:  Negative for rash.   Neurological:  Negative for weakness and headaches.       Objective   Physical Exam    Assessment    Diagnoses and all orders for this visit:    1. Diarrhea, unspecified type (Primary)  -     Giardia / Cryptosporidium Screen - Stool, Per Rectum  -     Gastrointestinal Panel, PCR - Stool, Per Rectum  -     CBC & Differential  -     Comprehensive Metabolic Panel  -     Lipase        Assessment & Plan  1.  Diarrhea.  - Watery diarrhea for the past 7 days, ranging from three to seven times a day, with no associated stomach pain, dehydration, blood in stool, black stools, fever, chills, nausea, or vomiting.  - Physical exam findings indicate no signs of dehydration or abdominal pain; no recent antibiotic use.  - Stool studies and blood work will be conducted to check for parasites and other potential causes; recent camping trip and consumption of ice water with a plastic taste or medication (Zepbound) could be contributing factors.  - Stool sample collection kit provided; advised to collect the sample at home and return it to the lab by 07/08/2025.        Return if symptoms worsen or fail to improve.    Patient or patient representative verbalized consent for the use of Ambient Listening during the visit with  SAJI Braun for chart documentation. 7/7/2025  15:31 EDT

## 2025-07-08 ENCOUNTER — RESULTS FOLLOW-UP (OUTPATIENT)
Dept: INTERNAL MEDICINE | Facility: CLINIC | Age: 55
End: 2025-07-08
Payer: COMMERCIAL

## 2025-07-08 ENCOUNTER — TELEPHONE (OUTPATIENT)
Dept: INTERNAL MEDICINE | Facility: CLINIC | Age: 55
End: 2025-07-08

## 2025-07-08 ENCOUNTER — TELEPHONE (OUTPATIENT)
Dept: INTERNAL MEDICINE | Facility: CLINIC | Age: 55
End: 2025-07-08
Payer: COMMERCIAL

## 2025-07-08 DIAGNOSIS — D64.9 ANEMIA, UNSPECIFIED TYPE: Primary | ICD-10-CM

## 2025-07-08 LAB
ALBUMIN SERPL-MCNC: 4.4 G/DL (ref 3.8–4.9)
ALP SERPL-CCNC: 84 IU/L (ref 44–121)
ALT SERPL-CCNC: 16 IU/L (ref 0–32)
AST SERPL-CCNC: 17 IU/L (ref 0–40)
BASOPHILS # BLD AUTO: 0 X10E3/UL (ref 0–0.2)
BASOPHILS NFR BLD AUTO: 1 %
BILIRUB SERPL-MCNC: 0.2 MG/DL (ref 0–1.2)
BUN SERPL-MCNC: 13 MG/DL (ref 6–24)
BUN/CREAT SERPL: 19 (ref 9–23)
CALCIUM SERPL-MCNC: 9.2 MG/DL (ref 8.7–10.2)
CHLORIDE SERPL-SCNC: 104 MMOL/L (ref 96–106)
CO2 SERPL-SCNC: 22 MMOL/L (ref 20–29)
CREAT SERPL-MCNC: 0.7 MG/DL (ref 0.57–1)
EGFRCR SERPLBLD CKD-EPI 2021: 103 ML/MIN/1.73
EOSINOPHIL # BLD AUTO: 0.2 X10E3/UL (ref 0–0.4)
EOSINOPHIL NFR BLD AUTO: 5 %
ERYTHROCYTE [DISTWIDTH] IN BLOOD BY AUTOMATED COUNT: 18.1 % (ref 11.7–15.4)
GLOBULIN SER CALC-MCNC: 2.8 G/DL (ref 1.5–4.5)
GLUCOSE SERPL-MCNC: 85 MG/DL (ref 70–99)
HCT VFR BLD AUTO: 34.9 % (ref 34–46.6)
HGB BLD-MCNC: 10.1 G/DL (ref 11.1–15.9)
IMM GRANULOCYTES # BLD AUTO: 0 X10E3/UL (ref 0–0.1)
IMM GRANULOCYTES NFR BLD AUTO: 0 %
LIPASE SERPL-CCNC: 44 U/L (ref 14–72)
LYMPHOCYTES # BLD AUTO: 1.9 X10E3/UL (ref 0.7–3.1)
LYMPHOCYTES NFR BLD AUTO: 36 %
MCH RBC QN AUTO: 22.3 PG (ref 26.6–33)
MCHC RBC AUTO-ENTMCNC: 28.9 G/DL (ref 31.5–35.7)
MCV RBC AUTO: 77 FL (ref 79–97)
MONOCYTES # BLD AUTO: 0.4 X10E3/UL (ref 0.1–0.9)
MONOCYTES NFR BLD AUTO: 8 %
NEUTROPHILS # BLD AUTO: 2.6 X10E3/UL (ref 1.4–7)
NEUTROPHILS NFR BLD AUTO: 50 %
PLATELET # BLD AUTO: 276 X10E3/UL (ref 150–450)
POTASSIUM SERPL-SCNC: 4.1 MMOL/L (ref 3.5–5.2)
PROT SERPL-MCNC: 7.2 G/DL (ref 6–8.5)
RBC # BLD AUTO: 4.52 X10E6/UL (ref 3.77–5.28)
SODIUM SERPL-SCNC: 139 MMOL/L (ref 134–144)
WBC # BLD AUTO: 5.2 X10E3/UL (ref 3.4–10.8)

## 2025-07-08 NOTE — TELEPHONE ENCOUNTER
CALLED PATIENT AND SPOKE WITH PATIENT.  LAB TEST REQUEST SENT.  SHE UNDERSTOOD HER RESULTS AND IS OK WITH COLONOSCOPY/EGD.

## 2025-07-08 NOTE — TELEPHONE ENCOUNTER
Scheduled CPE on 09/26/2025 at 9:00 am. Patient is aware of appointment information and will get with Keyla Wilson to see if she needs labs before her yearly since she recently had labs drawn.    Thanks,    Jessica    ----- Message from Kyleigh KEARNEY sent at 7/8/2025 10:42 AM EDT -----  PATIENT NEEDS CPE IN 2-3 MONTHS PLEASE.

## 2025-07-09 LAB
ADV 40+41 DNA STL QL NAA+NON-PROBE: NOT DETECTED
ASTRO TYP 1-8 RNA STL QL NAA+NON-PROBE: NOT DETECTED
C CAYETANENSIS DNA STL QL NAA+NON-PROBE: NOT DETECTED
C COLI+JEJ+UPSA DNA STL QL NAA+NON-PROBE: NOT DETECTED
C DIF TOX TCDA+TCDB STL QL NAA+NON-PROBE: NOT DETECTED
CRYPTOSP AG STL QL IA: NEGATIVE
CRYPTOSP DNA STL QL NAA+NON-PROBE: NOT DETECTED
E COLI O157 DNA STL QL NAA+NON-PROBE: NORMAL
E HISTOLYT DNA STL QL NAA+NON-PROBE: NOT DETECTED
EAEC PAA PLAS AGGR+AATA ST NAA+NON-PRB: NOT DETECTED
EC STX1+STX2 GENES STL QL NAA+NON-PROBE: NOT DETECTED
EPEC EAE GENE STL QL NAA+NON-PROBE: NOT DETECTED
ETEC LTA+ST1A+ST1B TOX ST NAA+NON-PROBE: NOT DETECTED
FERRITIN SERPL-MCNC: 12 NG/ML (ref 15–150)
G LAMBLIA AG STL QL IA: NEGATIVE
G LAMBLIA DNA STL QL NAA+NON-PROBE: NOT DETECTED
IRON SATN MFR SERPL: 4 % (ref 15–55)
IRON SERPL-MCNC: 17 UG/DL (ref 27–159)
NOROVIRUS GI+II RNA STL QL NAA+NON-PROBE: NOT DETECTED
P SHIGELLOIDES DNA STL QL NAA+NON-PROBE: NOT DETECTED
RVA RNA STL QL NAA+NON-PROBE: NOT DETECTED
S ENT+BONG DNA STL QL NAA+NON-PROBE: NOT DETECTED
SAPO I+II+IV+V RNA STL QL NAA+NON-PROBE: NOT DETECTED
SHIGELLA SP+EIEC IPAH ST NAA+NON-PROBE: NOT DETECTED
TIBC SERPL-MCNC: 413 UG/DL (ref 250–450)
UIBC SERPL-MCNC: 396 UG/DL (ref 131–425)
V CHOL+PARA+VUL DNA STL QL NAA+NON-PROBE: NOT DETECTED
V CHOLERAE DNA STL QL NAA+NON-PROBE: NOT DETECTED
VIT B12 SERPL-MCNC: 392 PG/ML (ref 232–1245)
WRITTEN AUTHORIZATION: NORMAL
Y ENTEROCOL DNA STL QL NAA+NON-PROBE: NOT DETECTED

## 2025-07-10 ENCOUNTER — TELEPHONE (OUTPATIENT)
Dept: INTERNAL MEDICINE | Facility: CLINIC | Age: 55
End: 2025-07-10
Payer: COMMERCIAL

## 2025-07-10 NOTE — TELEPHONE ENCOUNTER
I called patient back and spoke with her.   She wanted to know if she can take Iron otc for the low Iron, and if the Anemia could cause that.  I advised it can, and Keyla said patient can take Iron OTC in the mean time.   She said she had a long (about 2 weeks) menstrual cycle 4 weeks ago, could that cause all this?   She also has been noticing heart racing and out of rhythm at night, and read it could be from Anemia, is that correct?

## 2025-07-10 NOTE — TELEPHONE ENCOUNTER
Pt returning Kyleigh's call in regards to recent lab results- Advised pt of results per Keyla's note- Pt has other questions regarding results and referral that was placed for colonoscopy- Pt would like Kyleigh or Keyla Wilson  to give her a call back at earliest convenience please.      Best call back number   [phone]

## 2025-07-11 ENCOUNTER — TELEPHONE (OUTPATIENT)
Dept: INTERNAL MEDICINE | Facility: CLINIC | Age: 55
End: 2025-07-11
Payer: COMMERCIAL

## 2025-07-11 RX ORDER — FERROUS SULFATE 325(65) MG
325 TABLET ORAL
Qty: 30 TABLET | Refills: 3 | Status: SHIPPED | OUTPATIENT
Start: 2025-07-11

## 2025-07-11 NOTE — TELEPHONE ENCOUNTER
Pt returned my call  Iron and ferritin are low  Recommend iron 325mg once daily with the largest meal of the day. Advised it can cause constipation and recommend stool softeners if needed. Recommend increasing vitamin c to help absorb iron  She is perimenopausal and has had intermittent heavy periods that last 2 weeks and she is going to schedule an appt with her gyn   She is still waiting to hear from general surgery about consult for endoscopy - she has the number and will reach out to them  She reports she has not had any loose stool in the last few days. Stool studies were negative   She will follow up in sept and will repeat labs  Advised to follow up sooner or contact the office for any questions or concerns

## 2025-07-11 NOTE — TELEPHONE ENCOUNTER
I called Ruthie to go over the results in detail with her and answer her questions. I got VM box but it was full and would not let me leave a message. Will continue to try to reach her

## 2025-07-23 NOTE — PROGRESS NOTES
General Surgery Office Visit    Referring Provider:   SAJI Childs    Chief Complaint:    Iron deficiency anemia - consult for EGD and cscope    History of Present Illness:    Catarina Pack is a 55 y.o. female who presents for evaluation of newly diagnosed iron deficiency anemia. The patient was recently seen by her PCP for a week of watery diarrhea which began after a camping trip. Denies known sick contacts, blood in her stool, abdominal pain, nausea, vomiting, reflux, heartburn, fever, chills, urinary symptoms, or other associated symptoms. Her symptoms resolved on their own. She believes she is starting menopause and has had some irregular menses with her last period lasting longer than usual, nearly 2 weeks. She is very active, doing pilates every other day and walking her dogs daily.    She is adopted and does not know her family history. She denies any history of EGD.  Last colonoscopy images and report reviewed - this was in  ago by Dr. Perry, demonstrated a 5mm hyperplastic polyp in the transverse colon and perianal skin tags. Recall recommended in 5 years for surveillance.  She has never undergone any abdominal surgeries. She does not use any blood thinners.    Past Medical History:   Past Medical History:   Diagnosis Date    Allergic rhinitis     Anal skin tag 2021    BENIGN FIBROEPITHELIAL POLYP    Asthma     MILD,INTERMITTENT    Breast asymmetry     Colon polyps     FOLLOWED BY DR. NICOL PERRY     Fibrocystic mastopathy, left 2015    H/O threatened  2016    Hemorrhoids     Influenza A 2018    SAB (spontaneous )      A1        Past Surgical History:    Past Surgical History:   Procedure Laterality Date    COLONOSCOPY N/A 2021    5 MM BENIGN POLYP IN TRANSVERSE, ANAL SKIN TAG, RESCOPE IN 5 YRS, DR. NICOL PERRY AT [facility]    SKIN TAG REMOVAL N/A 2021    ANAL SKIN TAG EXCISION, PATH: BENIGN FIBROEPITHELIAL POLYP, DR. NICOL PERRY AT [facility]     "VAGINAL DELIVERY N/A 10/04/2013    DR. CHERRY PEREZ AT [facility]       Family History:    Family History   Adopted: Yes   Problem Relation Age of Onset    Diabetes Son        Social History:    Social History     Socioeconomic History    Marital status:    Tobacco Use    Smoking status: Never     Passive exposure: Never    Smokeless tobacco: Never   Vaping Use    Vaping status: Never Used   Substance and Sexual Activity    Alcohol use: Yes     Alcohol/week: 6.0 standard drinks of alcohol     Types: 6 Cans of beer per week    Drug use: Never    Sexual activity: Yes     Partners: Male     Birth control/protection: None     Comment: .       Allergies:   No Known Allergies    Medications:     Current Outpatient Medications:     ferrous sulfate 325 (65 FE) MG tablet, Take 1 tablet by mouth Daily With Breakfast., Disp: 30 tablet, Rfl: 3    Review of Systems:   Constitutional: denies fevers, chills  Eyes: denies vision changes or scleral icterus  Resp: denies cough, shortness of breath  CV: denies chest pain, heart palpitations  GI: denies abdominal pain, nausea, vomiting, +diarrhea, denies constipation, melena, hematochezia  : denies hematuria, dysuria  Skin: denies rash    Physical Exam:   /80   Pulse 65   Temp 97.9 °F (36.6 °C)   Ht 162.6 cm (64\")   Wt 68 kg (150 lb)   LMP 05/26/2025   SpO2 98%   BMI 25.75 kg/m²   Constitutional: Well-developed, well-nourished  Respiratory: Normal inspiratory effort  Cardiovascular: Regular rate, no peripheral edema  Gastrointestinal: Abd soft, nontender, nondistended, no rebound or guarding   Musculoskeletal: Symmetric strength  Psychiatric: Normal mood and affect  Skin:  Warm, dry, no rash on visualized skin surfaces    Labs:      CBC 7/7/25:  WBC 5.2, Hgb 10.2, Hct 34.9, Plt 276    CMP:  Lab Results   Component Value Date    GLUCOSE 85 07/07/2025    BUN 13 07/07/2025    CREATININE 0.70 07/07/2025     07/07/2025    K 4.1 07/07/2025     07/07/2025    " CALCIUM 9.2 07/07/2025    PROTEINTOT 7.2 07/07/2025    ALBUMIN 4.4 07/07/2025    ALT 16 07/07/2025    AST 17 07/07/2025    ALKPHOS 84 07/07/2025    BILITOT 0.2 07/07/2025    GLOB 2.8 07/07/2025    AGRATIO 1.7 02/16/2023    BCR 19 07/07/2025    EGFR 103 07/07/2025     Iron profile with ferritin 7/7/25 reviewed.     Assessment/Plan:  55 year old lady presenting with newly diagnosed iron deficiency anemia, recent self-resolving episode of diarrhea, irregular menses.     I discussed with the patient recommendation for esophagogastroduodenoscopy and colonoscopy for further evaluation of symptoms and to evaluate possible GI source of blood loss.      I obtained informed consent, discussing with the patient the benefits, risks of the procedure (bleeding possibly requiring hospitalization, transfusion, unplanned repeat endoscopy or surgery; GI tract perforation or other injury possibly requiring treatment; missed lesions; possibility of poor bowel prep; aspiration or other complications of sedation/anesthesia), alternatives, and postprocedure expectations.  I provided opportunity for the patient to ask questions and answered them to the best of my ability.  The patient wishes to proceed with EGD and colonoscopy.     Sherron Kahn M.D.  General, Robotic, and Endoscopic Surgery  [facility]    [address], [address]  [place], [place], [address]  P: [phone]  F: [phone]

## 2025-07-24 ENCOUNTER — OFFICE VISIT (OUTPATIENT)
Dept: SURGERY | Facility: CLINIC | Age: 55
End: 2025-07-24
Payer: COMMERCIAL

## 2025-07-24 VITALS
TEMPERATURE: 97.9 F | BODY MASS INDEX: 25.61 KG/M2 | HEIGHT: 64 IN | WEIGHT: 150 LBS | SYSTOLIC BLOOD PRESSURE: 128 MMHG | HEART RATE: 65 BPM | DIASTOLIC BLOOD PRESSURE: 80 MMHG | OXYGEN SATURATION: 98 %

## 2025-07-24 DIAGNOSIS — D50.9 IRON DEFICIENCY ANEMIA, UNSPECIFIED IRON DEFICIENCY ANEMIA TYPE: Primary | ICD-10-CM

## 2025-08-13 ENCOUNTER — ANESTHESIA EVENT (OUTPATIENT)
Dept: GASTROENTEROLOGY | Facility: HOSPITAL | Age: 55
End: 2025-08-13
Payer: COMMERCIAL

## 2025-08-13 ENCOUNTER — ANESTHESIA (OUTPATIENT)
Dept: GASTROENTEROLOGY | Facility: HOSPITAL | Age: 55
End: 2025-08-13
Payer: COMMERCIAL

## 2025-08-13 ENCOUNTER — HOSPITAL ENCOUNTER (OUTPATIENT)
Facility: HOSPITAL | Age: 55
Setting detail: HOSPITAL OUTPATIENT SURGERY
Discharge: HOME | End: 2025-08-13
Attending: STUDENT IN AN ORGANIZED HEALTH CARE EDUCATION/TRAINING PROGRAM | Admitting: STUDENT IN AN ORGANIZED HEALTH CARE EDUCATION/TRAINING PROGRAM
Payer: COMMERCIAL

## 2025-08-13 VITALS
HEART RATE: 72 BPM | HEIGHT: 64 IN | DIASTOLIC BLOOD PRESSURE: 73 MMHG | RESPIRATION RATE: 20 BRPM | OXYGEN SATURATION: 100 % | SYSTOLIC BLOOD PRESSURE: 133 MMHG | BODY MASS INDEX: 25.04 KG/M2 | WEIGHT: 146.7 LBS

## 2025-08-13 DIAGNOSIS — D50.9 IRON DEFICIENCY ANEMIA, UNSPECIFIED IRON DEFICIENCY ANEMIA TYPE: ICD-10-CM

## 2025-08-13 LAB
B-HCG UR QL: NEGATIVE
EXPIRATION DATE: NORMAL
INTERNAL NEGATIVE CONTROL: NEGATIVE
INTERNAL POSITIVE CONTROL: POSITIVE
Lab: NORMAL

## 2025-08-13 PROCEDURE — 25010000002 PROPOFOL 1000 MG/100ML EMULSION: Performed by: NURSE ANESTHETIST, CERTIFIED REGISTERED

## 2025-08-13 PROCEDURE — 45378 DIAGNOSTIC COLONOSCOPY: CPT | Performed by: STUDENT IN AN ORGANIZED HEALTH CARE EDUCATION/TRAINING PROGRAM

## 2025-08-13 PROCEDURE — 25810000003 LACTATED RINGERS PER 1000 ML: Performed by: STUDENT IN AN ORGANIZED HEALTH CARE EDUCATION/TRAINING PROGRAM

## 2025-08-13 PROCEDURE — 81025 URINE PREGNANCY TEST: CPT | Performed by: STUDENT IN AN ORGANIZED HEALTH CARE EDUCATION/TRAINING PROGRAM

## 2025-08-13 PROCEDURE — 25010000002 PROPOFOL 200 MG/20ML EMULSION: Performed by: NURSE ANESTHETIST, CERTIFIED REGISTERED

## 2025-08-13 PROCEDURE — 43239 EGD BIOPSY SINGLE/MULTIPLE: CPT | Performed by: STUDENT IN AN ORGANIZED HEALTH CARE EDUCATION/TRAINING PROGRAM

## 2025-08-13 PROCEDURE — 25010000002 LIDOCAINE 2% SOLUTION: Performed by: NURSE ANESTHETIST, CERTIFIED REGISTERED

## 2025-08-13 PROCEDURE — 88305 TISSUE EXAM BY PATHOLOGIST: CPT | Performed by: STUDENT IN AN ORGANIZED HEALTH CARE EDUCATION/TRAINING PROGRAM

## 2025-08-13 RX ORDER — LIDOCAINE HYDROCHLORIDE 20 MG/ML
INJECTION, SOLUTION INFILTRATION; PERINEURAL AS NEEDED
Status: DISCONTINUED | OUTPATIENT
Start: 2025-08-13 | End: 2025-08-13 | Stop reason: SURG

## 2025-08-13 RX ORDER — PROPOFOL 10 MG/ML
INJECTION, EMULSION INTRAVENOUS CONTINUOUS PRN
Status: DISCONTINUED | OUTPATIENT
Start: 2025-08-13 | End: 2025-08-13 | Stop reason: SURG

## 2025-08-13 RX ORDER — SODIUM CHLORIDE 0.9 % (FLUSH) 0.9 %
10 SYRINGE (ML) INJECTION AS NEEDED
Status: DISCONTINUED | OUTPATIENT
Start: 2025-08-13 | End: 2025-08-13 | Stop reason: HOSPADM

## 2025-08-13 RX ORDER — SODIUM CHLORIDE, SODIUM LACTATE, POTASSIUM CHLORIDE, CALCIUM CHLORIDE 600; 310; 30; 20 MG/100ML; MG/100ML; MG/100ML; MG/100ML
1000 INJECTION, SOLUTION INTRAVENOUS CONTINUOUS
Status: DISCONTINUED | OUTPATIENT
Start: 2025-08-13 | End: 2025-08-13 | Stop reason: HOSPADM

## 2025-08-13 RX ORDER — PROPOFOL 10 MG/ML
INJECTION, EMULSION INTRAVENOUS AS NEEDED
Status: DISCONTINUED | OUTPATIENT
Start: 2025-08-13 | End: 2025-08-13 | Stop reason: SURG

## 2025-08-13 RX ADMIN — LIDOCAINE HYDROCHLORIDE 3 ML: 20 INJECTION, SOLUTION INFILTRATION; PERINEURAL at 13:03

## 2025-08-13 RX ADMIN — SODIUM CHLORIDE, POTASSIUM CHLORIDE, SODIUM LACTATE AND CALCIUM CHLORIDE 1000 ML: 600; 310; 30; 20 INJECTION, SOLUTION INTRAVENOUS at 12:09

## 2025-08-13 RX ADMIN — PROPOFOL INJECTABLE EMULSION 150 MG: 10 INJECTION, EMULSION INTRAVENOUS at 13:03

## 2025-08-13 RX ADMIN — PROPOFOL 200 MCG/KG/MIN: 10 INJECTION, EMULSION INTRAVENOUS at 13:03

## 2025-08-14 LAB
CYTO UR: NORMAL
LAB AP CASE REPORT: NORMAL
PATH REPORT.FINAL DX SPEC: NORMAL
PATH REPORT.GROSS SPEC: NORMAL

## (undated) DEVICE — TUBING, SUCTION, 1/4" X 10', STRAIGHT: Brand: MEDLINE

## (undated) DEVICE — LN SMPL CO2 SHTRM SD STREAM W/M LUER

## (undated) DEVICE — CANN O2 ETCO2 FITS ALL CONN CO2 SMPL A/ 7IN DISP LF

## (undated) DEVICE — SENSR O2 OXIMAX FNGR A/ 18IN NONSTR

## (undated) DEVICE — BLCK/BITE BLOX W/DENTL/RIM W/STRAP 54F

## (undated) DEVICE — ADAPT CLN BIOGUARD AIR/H2O DISP

## (undated) DEVICE — SINGLE-USE BIOPSY FORCEPS: Brand: RADIAL JAW 4

## (undated) DEVICE — FRCP BX RADJAW4 NDL 2.8 240CM LG OG BX40

## (undated) DEVICE — KT ORCA ORCAPOD DISP STRL

## (undated) DEVICE — GOWN SURG AERO CHROME XL